# Patient Record
Sex: MALE | Employment: UNEMPLOYED | URBAN - METROPOLITAN AREA
[De-identification: names, ages, dates, MRNs, and addresses within clinical notes are randomized per-mention and may not be internally consistent; named-entity substitution may affect disease eponyms.]

---

## 2017-12-21 ENCOUNTER — THERAPY VISIT (OUTPATIENT)
Dept: PHYSICAL THERAPY | Facility: CLINIC | Age: 37
End: 2017-12-21
Payer: COMMERCIAL

## 2017-12-21 DIAGNOSIS — M25.561 ACUTE PAIN OF RIGHT KNEE: Primary | ICD-10-CM

## 2017-12-21 PROCEDURE — 97110 THERAPEUTIC EXERCISES: CPT | Mod: GP | Performed by: PHYSICAL THERAPIST

## 2017-12-21 PROCEDURE — 97112 NEUROMUSCULAR REEDUCATION: CPT | Mod: GP | Performed by: PHYSICAL THERAPIST

## 2017-12-21 PROCEDURE — 97161 PT EVAL LOW COMPLEX 20 MIN: CPT | Mod: GP | Performed by: PHYSICAL THERAPIST

## 2017-12-21 ASSESSMENT — ACTIVITIES OF DAILY LIVING (ADL)
WALK: ACTIVITY IS NOT DIFFICULT
AS_A_RESULT_OF_YOUR_KNEE_INJURY,_HOW_WOULD_YOU_RATE_YOUR_CURRENT_LEVEL_OF_DAILY_ACTIVITY?: NEARLY NORMAL
SIT WITH YOUR KNEE BENT: ACTIVITY IS NOT DIFFICULT
SWELLING: I DO NOT HAVE THE SYMPTOM
PAIN: THE SYMPTOM AFFECTS MY ACTIVITY MODERATELY
WEAKNESS: I DO NOT HAVE THE SYMPTOM
LIMPING: I DO NOT HAVE THE SYMPTOM
HOW_WOULD_YOU_RATE_THE_CURRENT_FUNCTION_OF_YOUR_KNEE_DURING_YOUR_USUAL_DAILY_ACTIVITIES_ON_A_SCALE_FROM_0_TO_100_WITH_100_BEING_YOUR_LEVEL_OF_KNEE_FUNCTION_PRIOR_TO_YOUR_INJURY_AND_0_BEING_THE_INABILITY_TO_PERFORM_ANY_OF_YOUR_USUAL_DAILY_ACTIVITIES?: 80
KNEE_ACTIVITY_OF_DAILY_LIVING_SCORE: 88.57
RISE FROM A CHAIR: ACTIVITY IS NOT DIFFICULT
GO UP STAIRS: ACTIVITY IS MINIMALLY DIFFICULT
GO DOWN STAIRS: ACTIVITY IS MINIMALLY DIFFICULT
SQUAT: ACTIVITY IS NOT DIFFICULT
HOW_WOULD_YOU_RATE_THE_OVERALL_FUNCTION_OF_YOUR_KNEE_DURING_YOUR_USUAL_DAILY_ACTIVITIES?: NEARLY NORMAL
KNEEL ON THE FRONT OF YOUR KNEE: ACTIVITY IS MINIMALLY DIFFICULT
RAW_SCORE: 62
KNEE_ACTIVITY_OF_DAILY_LIVING_SUM: 62
GIVING WAY, BUCKLING OR SHIFTING OF KNEE: THE SYMPTOM AFFECTS MY ACTIVITY SLIGHTLY
STAND: ACTIVITY IS NOT DIFFICULT
STIFFNESS: I DO NOT HAVE THE SYMPTOM

## 2017-12-21 NOTE — LETTER
[facility]  [address] [address] [place] [id number]  [phone]    2017    Re: Junito Wilks   :   1980  MRN:  [id number]   REFERRING PHYSICIAN:   Andres Boudreaux    [facility]  Date of Initial Evaluation:  2017  Visits:  Rxs Used: 1  Reason for Referral:  Acute pain of right knee    EVALUATION SUMMARY    [facility] Evaluation  Subjective:    Patient is a 37 year old male presenting with rehab right knee hpi. The history is provided by the patient. No  was used.   Junito Wilks is a 37 year old male with a right knee condition.  Condition occurred with:  Repetition/overuse.  Condition occurred: during recreation/sport.  This is a new condition  11/15/17 pt started noticing R knee pain from stepping up his workouts with weight lifting and the stairmill.  He is training to climb [employer].  He has been resting without success.  Patient reports pain:  Anterior.    Pain is described as aching and sharp and is constant Pain Scale: 1-6/10.  Associated symptoms:  Buckling/giving out. Pain is worse during the day.  Symptoms are exacerbated by descending stairs, ascending stairs, bending/squatting, running, walking and kneeling (weight lifting, working out, basketball) and relieved by nothing.  Since onset symptoms are unchanged.  General health as reported by patient is excellent. Pertinent medical history includes:  Sleep disorder/apnea.  Medical allergies: no.  Other surgeries include:  None reported.  Current medications:  Other.  Current occupation is Student.  Knee Activity of Daily Living Score: 88.57.    Barriers include:  Bathroom/bedroom on second floor.  Red flags:  None as reported by the patient.    Objective:    Standing Alignment:    Ankle/Foot:  Pes planus R  Gait:    Gait Type:  Normal     Flexibility/Screens:   Negative screens: Hip   Lower Extremity:  Decreased right lower extremity  flexibility:  Gastroc  Knee Evaluation:  ROM:  Arom wnl knee: good quad set and no extensor lag with SLR with 5/10 pain on R.  Strength wnl knee: glut max, hip abd, and glut med 5/5 B.      PROM    Hyperextension: Left: 5    Right:  3  Flexion: Left: 146    Right:  143  Ligament Testing:  Normal  Special Tests:   Left knee positive for the following special tests:  Joe's  Right knee positive for the following tests:  Joe's  Right knee negative for the following special tests:  Meniscal (Mild anterior knee pain wtih Thessaly's; neg Dago's)  Palpation:  Normal  Edema:  Normal  Mobility Testing:  Normal  Functional Testing:  : Mazariegos tape: lateral to medial glide decreased pain with SLR to 1/10.  Quad:    Single Leg Squat:  Left:        Normal control  Right:      Mod pes planus, femoral wobble, and R lateral trunk lean  Moderate loss of control and hip substitution  Bilateral Leg Squat:   Normal control      Assessment/Plan:      Patient is a 37 year old male with right side knee complaints.    Patient has the following significant findings with corresponding treatment plan.                Diagnosis 1:  PFP  Pain -  hot/cold therapy, splint/taping/bracing/orthotics and home program  Decreased ROM/flexibility - manual therapy and therapeutic exercise  Decreased strength - therapeutic exercise and therapeutic activities  Impaired balance - neuro re-education and therapeutic activities  Decreased proprioception - neuro re-education and therapeutic activities  Inflammation - cold therapy and self management/home program  Impaired muscle performance - neuro re-education  Decreased function - therapeutic activities    Therapy Evaluation Codes:   1) History comprised of:   Personal factors that impact the plan of care:      None.    Comorbidity factors that impact the plan of care are:      Sleep disorder/apnea.     Medications impacting care: None.  2) Examination of Body Systems comprised of:   Body structures and  functions that impact the plan of care:      Knee.   Activity limitations that impact the plan of care are:      Jumping, Lifting, Running, Sports, Squatting/kneeling, Stairs and Walking.  3) Clinical presentation characteristics are:   Stable/Uncomplicated.  4) Decision-Making    Low complexity using standardized patient assessment instrument and/or measureable assessment of functional outcome.  Cumulative Therapy Evaluation is: Low complexity.    Previous and current functional limitations:  (See Goal Flow Sheet for this information)    Short term and Long term goals: (See Goal Flow Sheet for this information)     Communication ability:  Patient appears to be able to clearly communicate and understand verbal and written communication and follow directions correctly.  Treatment Explanation - The following has been discussed with the patient:   RX ordered/plan of care  Anticipated outcomes  Possible risks and side effects  This patient would benefit from PT intervention to resume normal activities.   Rehab potential is good.    Frequency:  1 X week, once daily  Duration:  for 6 weeks  Discharge Plan:  Achieve all LTG.  Independent in home treatment program.  Reach maximal therapeutic benefit.    Thank you for your referral.    INQUIRIES  Therapist: Stacey Castro, PT, ATC   [address] [address]  Phone: [phone]  Fax: [phone]

## 2017-12-21 NOTE — PROGRESS NOTES
[facility] Evaluation  Subjective:    Patient is a 37 year old male presenting with rehab right knee hpi. The history is provided by the patient. No  was used.   Junito Wilks is a 37 year old male with a right knee condition.  Condition occurred with:  Repetition/overuse.  Condition occurred: during recreation/sport.  This is a new condition  11/15/17 pt started noticing R knee pain from stepping up his workouts with weight lifting and the stairmill.  He is training to climb [employer].  He has been resting without success..    Patient reports pain:  Anterior.    Pain is described as aching and sharp and is constant Pain Scale: 1-6/10.  Associated symptoms:  Buckling/giving out. Pain is worse during the day.  Symptoms are exacerbated by descending stairs, ascending stairs, bending/squatting, running, walking and kneeling (weight lifting, working out, basketball) and relieved by nothing.  Since onset symptoms are unchanged.        General health as reported by patient is excellent.                  Barriers include:  Bathroom/bedroom on second floor.    Red flags:  None as reported by the patient.                        Objective:    Standing Alignment:                Ankle/Foot:  Pes planus R    Gait:    Gait Type:  Normal         Flexibility/Screens:   Negative screens: Hip     Lower Extremity:      Decreased right lower extremity flexibility:  Gastroc                                                      Knee Evaluation:  ROM:  Arom wnl knee: good quad set and no extensor lag with SLR with 5/10 pain on R.  Strength wnl knee: glut max, hip abd, and glut med 5/5 B.    PROM    Hyperextension: Left: 5    Right:  3    Flexion: Left: 146    Right:  143        Ligament Testing:  Normal                Special Tests:   Left knee positive for the following special tests:  Joe's  Right knee positive for the following tests:  Joe's  Right knee negative for the following special  tests:  Meniscal (Mild anterior knee pain wtih Thessaly's; neg Dago's)  Palpation:  Normal      Edema:  Normal    Mobility Testing:  Normal            Functional Testing:  : Mazariegos tape: lateral to medial glide decreased pain with SLR to 1/10.        Quad:    Single Leg Squat:  Left:        Normal control  Right:      Mod pes planus, femoral wobble, and R lateral trunk lean  Moderate loss of control and hip substitution  Bilateral Leg Squat:   Normal control              General     ROS    Assessment/Plan:      Patient is a 37 year old male with right side knee complaints.    Patient has the following significant findings with corresponding treatment plan.                Diagnosis 1:  PFP  Pain -  hot/cold therapy, splint/taping/bracing/orthotics and home program  Decreased ROM/flexibility - manual therapy and therapeutic exercise  Decreased strength - therapeutic exercise and therapeutic activities  Impaired balance - neuro re-education and therapeutic activities  Decreased proprioception - neuro re-education and therapeutic activities  Inflammation - cold therapy and self management/home program  Impaired muscle performance - neuro re-education  Decreased function - therapeutic activities    Therapy Evaluation Codes:   1) History comprised of:   Personal factors that impact the plan of care:      None.    Comorbidity factors that impact the plan of care are:      Sleep disorder/apnea.     Medications impacting care: None.  2) Examination of Body Systems comprised of:   Body structures and functions that impact the plan of care:      Knee.   Activity limitations that impact the plan of care are:      Jumping, Lifting, Running, Sports, Squatting/kneeling, Stairs and Walking.  3) Clinical presentation characteristics are:   Stable/Uncomplicated.  4) Decision-Making    Low complexity using standardized patient assessment instrument and/or measureable assessment of functional outcome.  Cumulative Therapy  Evaluation is: Low complexity.    Previous and current functional limitations:  (See Goal Flow Sheet for this information)    Short term and Long term goals: (See Goal Flow Sheet for this information)     Communication ability:  Patient appears to be able to clearly communicate and understand verbal and written communication and follow directions correctly.  Treatment Explanation - The following has been discussed with the patient:   RX ordered/plan of care  Anticipated outcomes  Possible risks and side effects  This patient would benefit from PT intervention to resume normal activities.   Rehab potential is good.    Frequency:  1 X week, once daily  Duration:  for 6 weeks  Discharge Plan:  Achieve all LTG.  Independent in home treatment program.  Reach maximal therapeutic benefit.    Please refer to the daily flowsheet for treatment today, total treatment time and time spent performing 1:1 timed codes.

## 2017-12-22 NOTE — PROGRESS NOTES
[facility] Evaluation  Subjective:    Patient is a 37 year old male presenting with rehab left ankle/foot hpi.                                      Pertinent medical history includes:  Sleep disorder/apnea.  Medical allergies: no.  Other surgeries include:  None reported.  Current medications:  Other.  Current occupation is Student.                       Knee Activity of Daily Living Score: 88.57            Objective:    System    Physical Exam    General     ROS    Assessment/Plan:

## 2017-12-28 ENCOUNTER — THERAPY VISIT (OUTPATIENT)
Dept: PHYSICAL THERAPY | Facility: CLINIC | Age: 37
End: 2017-12-28
Payer: COMMERCIAL

## 2017-12-28 DIAGNOSIS — M25.561 ACUTE PAIN OF RIGHT KNEE: ICD-10-CM

## 2017-12-28 PROCEDURE — 97110 THERAPEUTIC EXERCISES: CPT | Mod: GP | Performed by: PHYSICAL THERAPIST

## 2017-12-28 PROCEDURE — 97112 NEUROMUSCULAR REEDUCATION: CPT | Mod: GP | Performed by: PHYSICAL THERAPIST

## 2017-12-28 PROCEDURE — 97140 MANUAL THERAPY 1/> REGIONS: CPT | Mod: GP | Performed by: PHYSICAL THERAPIST

## 2018-01-04 ENCOUNTER — THERAPY VISIT (OUTPATIENT)
Dept: PHYSICAL THERAPY | Facility: CLINIC | Age: 38
End: 2018-01-04
Payer: COMMERCIAL

## 2018-01-04 DIAGNOSIS — M25.561 ACUTE PAIN OF RIGHT KNEE: ICD-10-CM

## 2018-01-04 PROCEDURE — 97140 MANUAL THERAPY 1/> REGIONS: CPT | Mod: GP | Performed by: PHYSICAL THERAPIST

## 2018-01-04 PROCEDURE — 97110 THERAPEUTIC EXERCISES: CPT | Mod: GP | Performed by: PHYSICAL THERAPIST

## 2018-01-04 PROCEDURE — 97112 NEUROMUSCULAR REEDUCATION: CPT | Mod: GP | Performed by: PHYSICAL THERAPIST

## 2018-01-11 ENCOUNTER — THERAPY VISIT (OUTPATIENT)
Dept: PHYSICAL THERAPY | Facility: CLINIC | Age: 38
End: 2018-01-11
Payer: COMMERCIAL

## 2018-01-11 DIAGNOSIS — M25.561 ACUTE PAIN OF RIGHT KNEE: ICD-10-CM

## 2018-01-11 PROCEDURE — 97110 THERAPEUTIC EXERCISES: CPT | Mod: GP | Performed by: PHYSICAL THERAPIST

## 2018-01-11 PROCEDURE — 97112 NEUROMUSCULAR REEDUCATION: CPT | Mod: GP | Performed by: PHYSICAL THERAPIST

## 2018-01-11 PROCEDURE — 97140 MANUAL THERAPY 1/> REGIONS: CPT | Mod: GP | Performed by: PHYSICAL THERAPIST

## 2018-01-24 ENCOUNTER — THERAPY VISIT (OUTPATIENT)
Dept: PHYSICAL THERAPY | Facility: CLINIC | Age: 38
End: 2018-01-24
Payer: COMMERCIAL

## 2018-01-24 DIAGNOSIS — M25.561 ACUTE PAIN OF RIGHT KNEE: ICD-10-CM

## 2018-01-24 PROCEDURE — 97140 MANUAL THERAPY 1/> REGIONS: CPT | Mod: GP | Performed by: PHYSICAL THERAPIST

## 2018-01-24 PROCEDURE — 97110 THERAPEUTIC EXERCISES: CPT | Mod: GP | Performed by: PHYSICAL THERAPIST

## 2018-01-24 PROCEDURE — 97112 NEUROMUSCULAR REEDUCATION: CPT | Mod: GP | Performed by: PHYSICAL THERAPIST

## 2018-02-08 ENCOUNTER — THERAPY VISIT (OUTPATIENT)
Dept: PHYSICAL THERAPY | Facility: CLINIC | Age: 38
End: 2018-02-08
Payer: COMMERCIAL

## 2018-02-08 DIAGNOSIS — M25.561 ACUTE PAIN OF RIGHT KNEE: ICD-10-CM

## 2018-02-08 PROCEDURE — 97530 THERAPEUTIC ACTIVITIES: CPT | Mod: GP | Performed by: PHYSICAL THERAPIST

## 2018-02-08 PROCEDURE — 97112 NEUROMUSCULAR REEDUCATION: CPT | Mod: GP | Performed by: PHYSICAL THERAPIST

## 2018-02-08 PROCEDURE — 97110 THERAPEUTIC EXERCISES: CPT | Mod: GP | Performed by: PHYSICAL THERAPIST

## 2018-02-08 ASSESSMENT — ACTIVITIES OF DAILY LIVING (ADL)
GO UP STAIRS: ACTIVITY IS NOT DIFFICULT
KNEE_ACTIVITY_OF_DAILY_LIVING_SUM: 66
KNEEL ON THE FRONT OF YOUR KNEE: ACTIVITY IS SOMEWHAT DIFFICULT
KNEE_ACTIVITY_OF_DAILY_LIVING_SCORE: 94.29
SWELLING: I DO NOT HAVE THE SYMPTOM
PAIN: I HAVE THE SYMPTOM BUT IT DOES NOT AFFECT MY ACTIVITY
SQUAT: ACTIVITY IS NOT DIFFICULT
LIMPING: I DO NOT HAVE THE SYMPTOM
WEAKNESS: I DO NOT HAVE THE SYMPTOM
STIFFNESS: I DO NOT HAVE THE SYMPTOM
RAW_SCORE: 66
GIVING WAY, BUCKLING OR SHIFTING OF KNEE: I HAVE THE SYMPTOM BUT IT DOES NOT AFFECT MY ACTIVITY
HOW_WOULD_YOU_RATE_THE_OVERALL_FUNCTION_OF_YOUR_KNEE_DURING_YOUR_USUAL_DAILY_ACTIVITIES?: NORMAL
RISE FROM A CHAIR: ACTIVITY IS NOT DIFFICULT
SIT WITH YOUR KNEE BENT: ACTIVITY IS NOT DIFFICULT
WALK: ACTIVITY IS NOT DIFFICULT
GO DOWN STAIRS: ACTIVITY IS NOT DIFFICULT
AS_A_RESULT_OF_YOUR_KNEE_INJURY,_HOW_WOULD_YOU_RATE_YOUR_CURRENT_LEVEL_OF_DAILY_ACTIVITY?: NORMAL
STAND: ACTIVITY IS NOT DIFFICULT
HOW_WOULD_YOU_RATE_THE_CURRENT_FUNCTION_OF_YOUR_KNEE_DURING_YOUR_USUAL_DAILY_ACTIVITIES_ON_A_SCALE_FROM_0_TO_100_WITH_100_BEING_YOUR_LEVEL_OF_KNEE_FUNCTION_PRIOR_TO_YOUR_INJURY_AND_0_BEING_THE_INABILITY_TO_PERFORM_ANY_OF_YOUR_USUAL_DAILY_ACTIVITIES?: 88

## 2018-02-08 NOTE — PROGRESS NOTES
Subjective:  HPI                    Objective:  System    Physical Exam    General     ROS    Assessment/Plan:    PROGRESS  REPORT    Progress reporting period is from 12/21/17 to 2/8/18.       SUBJECTIVE  Subjective changes noted by patient:  Jose is reporting 60-70% improvement in his right knee.  C/C is that most days he still feels a dull ache to the knee.  Strength is progressing well.       Current pain level is 0-1/10  .    Initial Pain level:  (1-6/10).   Changes in function:  Yes (See Goal flowsheet attached for changes in current functional level)  Adverse reaction to treatment or activity: None    OBJECTIVE  -Right knee PROM full and pain free  -Good quad set and no extensor lag with SLR  -Mazariegos tape: lateral to medial glide is beneficial and he is using it for working out (no longer needs it all day, every day)  -OTC orthotics added and have been really helpful  -No TTP  -Mild discomfort with Thessaly's meniscus test with IR; neg McMurrary's  -Good form with 1 and 2 leg squats without UE support  -Tolerating high level core, LE CKC, and proprioceptive drills.  Able to advance to jumping today and will progress to jogging at next visit if all goes well.        ASSESSMENT/PLAN  Updated problem list and treatment plan: Diagnosis 1:  Right PFP  Pain -  hot/cold therapy, manual therapy, splint/taping/bracing/orthotics and home program  Decreased ROM/flexibility - manual therapy and therapeutic exercise  Decreased strength - therapeutic exercise and therapeutic activities  Impaired balance - neuro re-education and therapeutic activities  Decreased proprioception - neuro re-education and therapeutic activities  Inflammation - cold therapy and self management/home program  Impaired muscle performance - neuro re-education  Decreased function - therapeutic activities  STG/LTGs have been met or progress has been made towards goals:  Yes (See Goal flow sheet completed today.)  Assessment of Progress: The  patient's condition is improving.  Self Management Plans:  Patient has been instruct in a home exercise program and in self management of symptoms.  I have re-evaluated this patient and find that the nature, scope, duration and intensity of the therapy is appropriate for the medical condition of the patient.  Junito continues to require the following intervention to meet STG and LTG's:  PT    Recommendations:  This patient would benefit from continued therapy.     Frequency:  2 X a month, once daily  Duration:  for 1 months        Please refer to the daily flowsheet for treatment today, total treatment time and time spent performing 1:1 timed codes.

## 2018-02-08 NOTE — LETTER
[facility]  [address] [address] [place] [id number]  [phone]    2018    Re: Junito Wilks   :   1980  MRN:  [id number]   REFERRING PHYSICIAN:   Andres Boudreaux    [facility]  Date of Initial Evaluation:  2017  Visits:  Rxs Used: 6  Reason for Referral:  Acute pain of right knee    PROGRESS  REPORT  Progress reporting period is from 17 to 18.       SUBJECTIVE  Subjective changes noted by patient:  Jose is reporting 60-70% improvement in his right knee.  C/C is that most days he still feels a dull ache to the knee.  Strength is progressing well.    Current pain level is 0-1/10.    Initial Pain level:  (1-6/10).   Changes in function:  Yes (See Goal flowsheet attached for changes in current functional level).  Adverse reaction to treatment or activity: None    OBJECTIVE  -Right knee PROM full and pain free  -Good quad set and no extensor lag with SLR  -Mazariegos tape: lateral to medial glide is beneficial and he is using it for working out (no longer needs it all day, every day)  -OTC orthotics added and have been really helpful  -No TTP  -Mild discomfort with Thessaly's meniscus test with IR; neg McMurrary's  -Good form with 1 and 2 leg squats without UE support  -Tolerating high level core, LE CKC, and proprioceptive drills.  Able to advance to jumping today and will progress to jogging at next visit if all goes well.      ASSESSMENT/PLAN  Updated problem list and treatment plan: Diagnosis 1:  Right PFP  Pain -  hot/cold therapy, manual therapy, splint/taping/bracing/orthotics and home program  Decreased ROM/flexibility - manual therapy and therapeutic exercise  Decreased strength - therapeutic exercise and therapeutic activities  Impaired balance - neuro re-education and therapeutic activities  Decreased proprioception - neuro re-education and therapeutic activities  Inflammation - cold therapy and self management/home  program  Impaired muscle performance - neuro re-education  Decreased function - therapeutic activities  STG/LTGs have been met or progress has been made towards goals:  Yes (See Goal flow sheet completed today.)  Assessment of Progress: The patient's condition is improving.  Self Management Plans:  Patient has been instruct in a home exercise program and in self management of symptoms.  I have re-evaluated this patient and find that the nature, scope, duration and intensity of the therapy is appropriate for the medical condition of the patient.  Junito continues to require the following intervention to meet STG and LTG's:  PT    Recommendations:  This patient would benefit from continued therapy.     Frequency:  2 X a month, once daily  Duration:  for 1 months    Thank you for your referral.    INQUIRIES  Therapist: Stacey Castro, PT, ATC   [address] [address]  Phone: [phone]  Fax: [phone]

## 2018-02-21 ENCOUNTER — THERAPY VISIT (OUTPATIENT)
Dept: PHYSICAL THERAPY | Facility: CLINIC | Age: 38
End: 2018-02-21
Payer: COMMERCIAL

## 2018-02-21 DIAGNOSIS — M25.561 ACUTE PAIN OF RIGHT KNEE: ICD-10-CM

## 2018-02-21 PROCEDURE — 97530 THERAPEUTIC ACTIVITIES: CPT | Mod: GP | Performed by: PHYSICAL THERAPIST

## 2018-02-21 PROCEDURE — 97110 THERAPEUTIC EXERCISES: CPT | Mod: GP | Performed by: PHYSICAL THERAPIST

## 2018-02-21 PROCEDURE — 97112 NEUROMUSCULAR REEDUCATION: CPT | Mod: GP | Performed by: PHYSICAL THERAPIST

## 2018-02-21 NOTE — LETTER
[facility]  [address] [address] [place] [id number]  [phone]    2018    Re: Junito Wilks   :   1980  MRN:  [id number]   REFERRING PHYSICIAN:   Andres Boudreaux    [facility]    Date of Initial Evaluation:  2017  Visits:  Rxs Used: 7 (add order x2)  Reason for Referral:  Acute pain of right knee    DISCHARGE REPORT  Progress reporting period is from 18 to 18.     SUBJECTIVE  Subjective: Sig improvement overall.  Tolerating hard workout and jumping and no issues.  Feeling good sitting in class.  Cont to tape for workouts.  Pt called and cancelled his remaining appointment as he is feeling good.  Current Pain level: 0/10.  Initial Pain level:  (1-6/10).  Changes in function: Yes, see goal flow sheet for change in function.  Adverse reactions: None.  The objective findings are from DOS 18.    OBJECTIVE  Objective: No TTP or tone issues to distal/lat quad.  SL hop: L 110; R 90 (82%)  Good form with jumping and jogging.  INstruct in progression.      ASSESSMENT/PLAN  Updated problem list and treatment plan: Diagnosis 1:  PFP  Pain -  hot/cold therapy, manual therapy, splint/taping/bracing/orthotics and home program  Decreased ROM/flexibility - manual therapy and therapeutic exercise  Decreased strength - therapeutic exercise and therapeutic activities  Decreased proprioception - neuro re-education and therapeutic activities  Inflammation - cold therapy and self management/home program  Impaired muscle performance - neuro re-education  Decreased function - therapeutic activities  STG/LTGs have been met or progress has been made towards goals:  Yes (See Goal flow sheet completed today.)  Assessment of Progress: The patient's condition is improving.  Self Management Plans:  Patient is independent in a home treatment program.  Patient is independent in self management of symptoms.  I have re-evaluated this patient and find that the  nature, scope, duration and intensity of the therapy is appropriate for the medical condition of the patient.  Junito continues to require the following intervention to meet STG and LTG's: PT intervention is no longer required to meet STG/LTG.  We will discharge this patient from PT.    Recommendations:  This patient is ready to be discharged from therapy and continue their home treatment program.    Thank you for your referral.    INQUIRIES  Therapist: Stacey Castro, PT, ATC  [address] [address]  Phone: [phone]  Fax: [phone]

## 2018-03-22 PROBLEM — M25.561 ACUTE PAIN OF RIGHT KNEE: Status: RESOLVED | Noted: 2017-12-21 | Resolved: 2018-03-22

## 2018-03-22 NOTE — PROGRESS NOTES
Subjective:  HPI                    Objective:  System    Physical Exam    General     ROS    Assessment/Plan:    DISCHARGE REPORT    Progress reporting period is from 2/8/18 to 2/21/18.     SUBJECTIVE  Subjective: Sig improvement overall.  Tolerating hard workout and jumping and no issues.  Feeling good sitting in class.  Cont to tape for workouts.  Pt called and cancelled his remaining appointment as he is feeling good.  Current Pain level: 0/10   Initial Pain level:  (1-6/10)   Changes in function: Yes, see goal flow sheet for change in function   Adverse reactions: None;   ,     The objective findings are from DOS 2/21/18.    OBJECTIVE  Objective: No TTP or tone issues to distal/lat quad.  SL hop: L 110; R 90 (82%)  Good form with jumping and jogging.  INstruct in progression.      ASSESSMENT/PLAN  Updated problem list and treatment plan: Diagnosis 1:  PFP  Pain -  hot/cold therapy, manual therapy, splint/taping/bracing/orthotics and home program  Decreased ROM/flexibility - manual therapy and therapeutic exercise  Decreased strength - therapeutic exercise and therapeutic activities  Decreased proprioception - neuro re-education and therapeutic activities  Inflammation - cold therapy and self management/home program  Impaired muscle performance - neuro re-education  Decreased function - therapeutic activities  STG/LTGs have been met or progress has been made towards goals:  Yes (See Goal flow sheet completed today.)  Assessment of Progress: The patient's condition is improving.  Self Management Plans:  Patient is independent in a home treatment program.  Patient is independent in self management of symptoms.  I have re-evaluated this patient and find that the nature, scope, duration and intensity of the therapy is appropriate for the medical condition of the patient.  Junito continues to require the following intervention to meet STG and LTG's: PT intervention is no longer required to meet STG/LTG.  We will  discharge this patient from PT.    Recommendations:  This patient is ready to be discharged from therapy and continue their home treatment program.

## 2020-12-21 ENCOUNTER — THERAPY VISIT (OUTPATIENT)
Dept: PHYSICAL THERAPY | Facility: CLINIC | Age: 40
End: 2020-12-21
Payer: COMMERCIAL

## 2020-12-21 DIAGNOSIS — M25.571 PAIN IN JOINT, ANKLE AND FOOT, RIGHT: ICD-10-CM

## 2020-12-21 PROCEDURE — 97162 PT EVAL MOD COMPLEX 30 MIN: CPT | Mod: GP | Performed by: PHYSICAL THERAPIST

## 2020-12-21 PROCEDURE — 97110 THERAPEUTIC EXERCISES: CPT | Mod: GP | Performed by: PHYSICAL THERAPIST

## 2020-12-21 PROCEDURE — 97140 MANUAL THERAPY 1/> REGIONS: CPT | Mod: GP | Performed by: PHYSICAL THERAPIST

## 2020-12-21 NOTE — LETTER
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 He has had a 2 year history of right ankle pain. He is a runner and mountaineer. It was found that he had avulsed his tendon. He underwent ankle stabilization surgery on 2020. Wearing a CAM and using two crutches at this time. 3/10 pain. Very motivated to start a HEP and to gradually wean from the CAM.  The history is provided by the patient. No  was used.     Therapist Generated HPI Evaluation  Type of problem:  Right ankle.  This is a chronic condition.  Condition occurred with:  Repetition/overuse and running (mountaineering).  Patient reports pain:  Lower leg (incisional).  Pain is described as aching and is constant.  Pain is worse during the day.  Since onset symptoms are gradually improving.  Associated symptoms:  Loss of motion/stiffness, loss of strength and edema. Exacerbated by: post surgical.  and relieved by rest and bracing/immobilizing.  Work activity restrictions: currently not working.  Barriers include:  None as reported by patient.    Patient Health History  Pain is reported as 3/10 on pain scale.  General health as reported by patient is excellent.  Pertinent medical history includes: depression, history of fractures, sleep disorder/apnea and numbness/tingling.   Red flags:  Pain at rest/night.  Medical allergies: none.   Surgeries include:  Orthopedic surgery.    Current medications:  Sleep medication and anti-depressants.   "  Current occupation is physical therapist.          Re: Junito Wilks   :   1980    Objective:    Gait:    Assistive Devices:  Crutches and CAM    Ankle/Foot Evaluation  ROM:    AROM:    Dorsiflexion: Left:    Right:   0  Plantarflexion: Left:     Right:  20    Strength is not assessed.    PALPATION:   Right ankle tenderness present at:   gastroc/soleus; incisional and anterior tibiofibular ligament    EDEMA:   Right ankle edema present at:  \"      Figure 8 right: \"                                       Musculoskeletal:        Feet:            Re: Junito Wilks   :   1980    Assessment/Plan:    Patient is a 40 year old male with right side ankle complaints.    Patient has the following significant findings with corresponding treatment plan.                Diagnosis 1:  S/P right ankle stabilization SX  Pain -  hot/cold therapy, manual therapy, self management, education and home program  Decreased ROM/flexibility - manual therapy, therapeutic exercise and home program  Decreased strength - therapeutic exercise, therapeutic activities and home program  Impaired balance - neuro re-education, therapeutic activities and home program  Edema - self management/home program  Impaired gait - gait training and home program  Impaired muscle performance - neuro re-education and home program  Decreased function - therapeutic activities and home program    Therapy Evaluation Codes:   1) History comprised of:   Personal factors that impact the plan of care:      None.    Comorbidity factors that impact the plan of care are:      Depression, Pain at night/rest and Sleep disorder/apnea.     Medications impacting care: Anti-depressant and Sleep.  2) Examination of Body Systems comprised of:   Body structures and functions that impact the plan of care:      Ankle.   Activity limitations that impact the plan of care are:      Running and Walking.  3) Clinical presentation characteristics " are:   Evolving/Changing.  4) Decision-Making    Moderate complexity using standardized patient assessment instrument and/or measureable assessment of functional outcome.  Cumulative Therapy Evaluation is: Moderate complexity.    Previous and current functional limitations:  (See Goal Flow Sheet for this information)    Short term and Long term goals: (See Goal Flow Sheet for this information)     Communication ability:  Patient appears to be able to clearly communicate and understand verbal and written communication and follow directions correctly.  Treatment Explanation - The following has been discussed with the patient:   RX ordered/plan of care  Anticipated outcomes  Possible risks and side effects  This patient would benefit from PT intervention to resume normal activities.   Rehab potential is good.    Frequency:  1 X week, once daily  Duration:  for 12 weeks  Discharge Plan:  Achieve all LTG.  Independent in home treatment program.  Reach maximal therapeutic benefit.      Re: Junito Wilks   :   1980      Thank you for your referral.    INQUIRIES  Therapist: Beth Dale PT, DPT  [address] [address]  Phone: [phone]  Fax: [phone]

## 2020-12-22 NOTE — PROGRESS NOTES
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 He has had a 2 year history of right ankle pain. He is a runner and mountaineer. It was found that he had avulsed his tendon. He underwent ankle stabilization surgery on 11/19/2020. Wearing a CAM and using two crutches at this time. 3/10 pain. Very motivated to start a HEP and to gradually wean from the CAM.    The history is provided by the patient. No  was used.   Therapist Generated HPI Evaluation         Type of problem:  Right ankle.    This is a chronic condition.  Condition occurred with:  Repetition/overuse and running (mountaineering).    Patient reports pain:  Lower leg (incisional).  Pain is described as aching and is constant.  Pain is worse during the day.  Since onset symptoms are gradually improving.  Associated symptoms:  Loss of motion/stiffness, loss of strength and edema. Exacerbated by: post surgical.  and relieved by rest and bracing/immobilizing.      Work activity restrictions: currently not working.  Barriers include:  None as reported by patient.    Patient Health History         Pain is reported as 3/10 on pain scale.  General health as reported by patient is excellent.  Pertinent medical history includes: depression, history of fractures, sleep disorder/apnea and numbness/tingling.   Red flags:  Pain at rest/night.  Medical allergies: none.   Surgeries include:  Orthopedic surgery.    Current medications:  Sleep medication and anti-depressants.    Current occupation is physical therapist.                                       Objective:    Gait:    Assistive Devices:  Crutches and CAM            Ankle/Foot Evaluation  ROM:    AROM:    Dorsiflexion: Left:    Right:   0  Plantarflexion: Left:     Right:  20            Strength is not assessed.      PALPATION:     Right ankle tenderness present at:   gastroc/soleus;  "incisional and anterior tibiofibular ligament  EDEMA:     Right ankle edema present at:  1/2\"      Figure 8 right: 1/2\"                                                                                           Musculoskeletal:        Feet:        ROS    Assessment/Plan:    Patient is a 40 year old male with right side ankle complaints.    Patient has the following significant findings with corresponding treatment plan.                Diagnosis 1:  S/P right ankle stabilization SX  Pain -  hot/cold therapy, manual therapy, self management, education and home program  Decreased ROM/flexibility - manual therapy, therapeutic exercise and home program  Decreased strength - therapeutic exercise, therapeutic activities and home program  Impaired balance - neuro re-education, therapeutic activities and home program  Edema - self management/home program  Impaired gait - gait training and home program  Impaired muscle performance - neuro re-education and home program  Decreased function - therapeutic activities and home program    Therapy Evaluation Codes:   1) History comprised of:   Personal factors that impact the plan of care:      None.    Comorbidity factors that impact the plan of care are:      Depression, Pain at night/rest and Sleep disorder/apnea.     Medications impacting care: Anti-depressant and Sleep.  2) Examination of Body Systems comprised of:   Body structures and functions that impact the plan of care:      Ankle.   Activity limitations that impact the plan of care are:      Running and Walking.  3) Clinical presentation characteristics are:   Evolving/Changing.  4) Decision-Making    Moderate complexity using standardized patient assessment instrument and/or measureable assessment of functional outcome.  Cumulative Therapy Evaluation is: Moderate complexity.    Previous and current functional limitations:  (See Goal Flow Sheet for this information)    Short term and Long term goals: (See Goal Flow Sheet " for this information)     Communication ability:  Patient appears to be able to clearly communicate and understand verbal and written communication and follow directions correctly.  Treatment Explanation - The following has been discussed with the patient:   RX ordered/plan of care  Anticipated outcomes  Possible risks and side effects  This patient would benefit from PT intervention to resume normal activities.   Rehab potential is good.    Frequency:  1 X week, once daily  Duration:  for 12 weeks  Discharge Plan:  Achieve all LTG.  Independent in home treatment program.  Reach maximal therapeutic benefit.    Please refer to the daily flowsheet for treatment today, total treatment time and time spent performing 1:1 timed codes.

## 2020-12-29 ENCOUNTER — THERAPY VISIT (OUTPATIENT)
Dept: PHYSICAL THERAPY | Facility: CLINIC | Age: 40
End: 2020-12-29
Payer: COMMERCIAL

## 2020-12-29 DIAGNOSIS — M25.571 PAIN IN JOINT, ANKLE AND FOOT, RIGHT: ICD-10-CM

## 2020-12-29 PROCEDURE — 97140 MANUAL THERAPY 1/> REGIONS: CPT | Mod: GP | Performed by: PHYSICAL THERAPIST

## 2020-12-29 PROCEDURE — 97110 THERAPEUTIC EXERCISES: CPT | Mod: GP | Performed by: PHYSICAL THERAPIST

## 2020-12-31 ENCOUNTER — THERAPY VISIT (OUTPATIENT)
Dept: PHYSICAL THERAPY | Facility: CLINIC | Age: 40
End: 2020-12-31
Payer: COMMERCIAL

## 2020-12-31 DIAGNOSIS — M25.571 PAIN IN JOINT, ANKLE AND FOOT, RIGHT: ICD-10-CM

## 2020-12-31 PROCEDURE — 97110 THERAPEUTIC EXERCISES: CPT | Mod: GP | Performed by: PHYSICAL THERAPIST

## 2020-12-31 PROCEDURE — 97116 GAIT TRAINING THERAPY: CPT | Mod: GP | Performed by: PHYSICAL THERAPIST

## 2020-12-31 PROCEDURE — 97140 MANUAL THERAPY 1/> REGIONS: CPT | Mod: GP | Performed by: PHYSICAL THERAPIST

## 2021-01-05 ENCOUNTER — THERAPY VISIT (OUTPATIENT)
Dept: PHYSICAL THERAPY | Facility: CLINIC | Age: 41
End: 2021-01-05
Payer: COMMERCIAL

## 2021-01-05 DIAGNOSIS — M25.571 PAIN IN JOINT, ANKLE AND FOOT, RIGHT: ICD-10-CM

## 2021-01-05 PROCEDURE — 97116 GAIT TRAINING THERAPY: CPT | Mod: GP | Performed by: PHYSICAL THERAPIST

## 2021-01-05 PROCEDURE — 97110 THERAPEUTIC EXERCISES: CPT | Mod: GP | Performed by: PHYSICAL THERAPIST

## 2021-01-05 PROCEDURE — 97140 MANUAL THERAPY 1/> REGIONS: CPT | Mod: GP | Performed by: PHYSICAL THERAPIST

## 2021-01-07 ENCOUNTER — THERAPY VISIT (OUTPATIENT)
Dept: PHYSICAL THERAPY | Facility: CLINIC | Age: 41
End: 2021-01-07
Payer: COMMERCIAL

## 2021-01-07 DIAGNOSIS — M25.571 PAIN IN JOINT, ANKLE AND FOOT, RIGHT: ICD-10-CM

## 2021-01-07 PROCEDURE — 97140 MANUAL THERAPY 1/> REGIONS: CPT | Mod: GP | Performed by: PHYSICAL THERAPIST

## 2021-01-07 PROCEDURE — 97110 THERAPEUTIC EXERCISES: CPT | Mod: GP | Performed by: PHYSICAL THERAPIST

## 2021-01-07 PROCEDURE — 97116 GAIT TRAINING THERAPY: CPT | Mod: GP | Performed by: PHYSICAL THERAPIST

## 2021-01-12 ENCOUNTER — THERAPY VISIT (OUTPATIENT)
Dept: PHYSICAL THERAPY | Facility: CLINIC | Age: 41
End: 2021-01-12
Payer: COMMERCIAL

## 2021-01-12 DIAGNOSIS — M25.571 PAIN IN JOINT, ANKLE AND FOOT, RIGHT: ICD-10-CM

## 2021-01-12 PROCEDURE — 97110 THERAPEUTIC EXERCISES: CPT | Mod: GP | Performed by: PHYSICAL THERAPIST

## 2021-01-12 PROCEDURE — 97140 MANUAL THERAPY 1/> REGIONS: CPT | Mod: GP | Performed by: PHYSICAL THERAPIST

## 2021-01-14 ENCOUNTER — THERAPY VISIT (OUTPATIENT)
Dept: PHYSICAL THERAPY | Facility: CLINIC | Age: 41
End: 2021-01-14
Payer: COMMERCIAL

## 2021-01-14 DIAGNOSIS — M25.571 PAIN IN JOINT, ANKLE AND FOOT, RIGHT: ICD-10-CM

## 2021-01-14 PROCEDURE — 97140 MANUAL THERAPY 1/> REGIONS: CPT | Mod: GP | Performed by: PHYSICAL THERAPIST

## 2021-01-14 PROCEDURE — 97110 THERAPEUTIC EXERCISES: CPT | Mod: GP | Performed by: PHYSICAL THERAPIST

## 2021-01-14 PROCEDURE — 97116 GAIT TRAINING THERAPY: CPT | Mod: GP | Performed by: PHYSICAL THERAPIST

## 2021-01-19 ENCOUNTER — THERAPY VISIT (OUTPATIENT)
Dept: PHYSICAL THERAPY | Facility: CLINIC | Age: 41
End: 2021-01-19
Payer: COMMERCIAL

## 2021-01-19 DIAGNOSIS — M25.571 PAIN IN JOINT, ANKLE AND FOOT, RIGHT: ICD-10-CM

## 2021-01-19 PROCEDURE — 97140 MANUAL THERAPY 1/> REGIONS: CPT | Mod: GP | Performed by: PHYSICAL THERAPIST

## 2021-01-19 PROCEDURE — 97116 GAIT TRAINING THERAPY: CPT | Mod: GP | Performed by: PHYSICAL THERAPIST

## 2021-01-19 PROCEDURE — 97110 THERAPEUTIC EXERCISES: CPT | Mod: GP | Performed by: PHYSICAL THERAPIST

## 2021-01-21 ENCOUNTER — THERAPY VISIT (OUTPATIENT)
Dept: PHYSICAL THERAPY | Facility: CLINIC | Age: 41
End: 2021-01-21
Payer: COMMERCIAL

## 2021-01-21 DIAGNOSIS — M25.571 PAIN IN JOINT, ANKLE AND FOOT, RIGHT: ICD-10-CM

## 2021-01-21 PROCEDURE — 97116 GAIT TRAINING THERAPY: CPT | Mod: GP | Performed by: PHYSICAL THERAPIST

## 2021-01-21 PROCEDURE — 97110 THERAPEUTIC EXERCISES: CPT | Mod: GP | Performed by: PHYSICAL THERAPIST

## 2021-01-21 PROCEDURE — 97140 MANUAL THERAPY 1/> REGIONS: CPT | Mod: GP | Performed by: PHYSICAL THERAPIST

## 2021-01-26 ENCOUNTER — THERAPY VISIT (OUTPATIENT)
Dept: PHYSICAL THERAPY | Facility: CLINIC | Age: 41
End: 2021-01-26
Payer: COMMERCIAL

## 2021-01-26 DIAGNOSIS — M25.571 PAIN IN JOINT, ANKLE AND FOOT, RIGHT: ICD-10-CM

## 2021-01-26 PROCEDURE — 97140 MANUAL THERAPY 1/> REGIONS: CPT | Mod: GP | Performed by: PHYSICAL THERAPIST

## 2021-01-26 PROCEDURE — 97110 THERAPEUTIC EXERCISES: CPT | Mod: GP | Performed by: PHYSICAL THERAPIST

## 2021-01-26 PROCEDURE — 97116 GAIT TRAINING THERAPY: CPT | Mod: GP | Performed by: PHYSICAL THERAPIST

## 2021-01-29 ENCOUNTER — THERAPY VISIT (OUTPATIENT)
Dept: PHYSICAL THERAPY | Facility: CLINIC | Age: 41
End: 2021-01-29
Payer: COMMERCIAL

## 2021-01-29 DIAGNOSIS — M25.571 PAIN IN JOINT, ANKLE AND FOOT, RIGHT: ICD-10-CM

## 2021-01-29 PROCEDURE — 97110 THERAPEUTIC EXERCISES: CPT | Mod: GP | Performed by: PHYSICAL THERAPIST

## 2021-01-29 PROCEDURE — 97116 GAIT TRAINING THERAPY: CPT | Mod: GP | Performed by: PHYSICAL THERAPIST

## 2021-01-29 PROCEDURE — 97140 MANUAL THERAPY 1/> REGIONS: CPT | Mod: GP | Performed by: PHYSICAL THERAPIST

## 2021-01-29 NOTE — LETTER
"[facility]  [address] [address] [place] [id number]  [phone]    2021    Re: Junito Wilks   :   1980  MRN:  [id number]   REFERRING PHYSICIAN:   Charlie Sanchez    [facility]    Date of Initial Evaluation:  2020  Visits:  Rxs Used: 11  Reason for Referral:  Pain in joint, ankle and foot, right    PROGRESS  REPORT  Progress reporting period is from 2020 to 2021.       SUBJECTIVE  Subjective changes noted by patient:  Overall Jose has done well. He is 10 weeks post op. Weaned from his CAM boot for indoor walking, but continues to use it for outdoor walks and when he wants to walk fast. He has been very motivated with his HEP. The concern is that he has lateral ankle pain, with some \"snapping\" when he tries to walk faster or works on squats. This has been recalcitrant despite best effort with strengthening. Decreases with rest.  Current pain level: Varies from 1 - 5/10   Previous pain level was  3/10 when he was wearing his boot.  Changes in function:  Using the boot for stairs, can walk x 20' indoors without the boot. SLS x 1'.  Adverse reaction to treatment or activity: Need to avoid DF and eversion yet.    OBJECTIVE:  Changes noted in objective findings:  1/2\" edema with figure 8. Strength is good throughout when not loading the joint. DF to 7 degrees. 40 degrees PF.      ASSESSMENT/PLAN  Updated problem list and treatment plan: Diagnosis 1:  S/P right ankle tightrope procedure. Pain -  hot/cold therapy, manual therapy, splint/taping/bracing/orthotics, self management, education and home program  Decreased ROM/flexibility - manual therapy, therapeutic exercise and home program  Decreased strength - therapeutic exercise, therapeutic activities and home program  Impaired balance - neuro re-education, therapeutic activities and home program  Edema - self management/home program  Impaired gait - gait training and home " program  Impaired muscle performance - neuro re-education and home program  Decreased function - therapeutic activities and home program  STG/LTGs have been met or progress has been made towards goals:  Yes (See Goal flow sheet completed today.)  Assessment of Progress: The patient's progress has plateaued.  Self Management Plans:  Patient has been instructed in a home treatment program.  Patient  has been instructed in self management of symptoms.      Recommendations:  Asking the patient to return to see his surgeon, to assess if we are on course with his recovery.    Thank you for your referral.    INQUIRIES  Therapist: Terri Dale, PT, DPT  [address] [address]  Phone: [phone]  Fax: [phone]

## 2021-01-29 NOTE — PROGRESS NOTES
"Subjective:  HPI  Physical Exam                    Objective:  System    Physical Exam    General     ROS    Assessment/Plan:    PROGRESS  REPORT    Progress reporting period is from 12/21/2020 to 1/29/2021.       SUBJECTIVE  Subjective changes noted by patient:  Overall Jose has done well. He is 10 weeks post op. Weaned from his CAM boot for indoor walking, but continues to use it for outdoor walks and when he wants to walk fast. He has been very motivated with his HEP. The concern is that he has lateral ankle pain, with some \"snapping\" when he tries to walk faster or works on squats. This has been recalcitrant despite best effort with strengthening. Decreases with rest..       Current pain level: Varies from 1 - 5/10   Previous pain level was  3/10 when he was wearing his boot.  .   Changes in function:  Using the boot for stairs, can walk x 20' indoors without the boot. SLS x 1'.  Adverse reaction to treatment or activity: Need to avoid DF and eversion yet.    OBJECTIVE:  Changes noted in objective findings:  1/2\" edema with figure 8. Strength is good throughout when not loading the joint. DF to 7 degrees. 40 degrees PF.      ASSESSMENT/PLAN  Updated problem list and treatment plan: Diagnosis 1:  S/P right ankle tightrope procedure. Pain -  hot/cold therapy, manual therapy, splint/taping/bracing/orthotics, self management, education and home program  Decreased ROM/flexibility - manual therapy, therapeutic exercise and home program  Decreased strength - therapeutic exercise, therapeutic activities and home program  Impaired balance - neuro re-education, therapeutic activities and home program  Edema - self management/home program  Impaired gait - gait training and home program  Impaired muscle performance - neuro re-education and home program  Decreased function - therapeutic activities and home program  STG/LTGs have been met or progress has been made towards goals:  Yes (See Goal flow sheet completed " today.)  Assessment of Progress: The patient's progress has plateaued.  Self Management Plans:  Patient has been instructed in a home treatment program.  Patient  has been instructed in self management of symptoms.      Recommendations:  Asking the patient to return to see his surgeon, to assess if we are on course with his recovery.    Please refer to the daily flowsheet for treatment today, total treatment time and time spent performing 1:1 timed codes.

## 2021-02-04 ENCOUNTER — THERAPY VISIT (OUTPATIENT)
Dept: PHYSICAL THERAPY | Facility: CLINIC | Age: 41
End: 2021-02-04
Payer: COMMERCIAL

## 2021-02-04 DIAGNOSIS — M25.571 PAIN IN JOINT, ANKLE AND FOOT, RIGHT: ICD-10-CM

## 2021-02-04 PROCEDURE — 97140 MANUAL THERAPY 1/> REGIONS: CPT | Mod: GP | Performed by: PHYSICAL THERAPIST

## 2021-02-04 PROCEDURE — 97116 GAIT TRAINING THERAPY: CPT | Mod: GP | Performed by: PHYSICAL THERAPIST

## 2021-02-09 ENCOUNTER — THERAPY VISIT (OUTPATIENT)
Dept: PHYSICAL THERAPY | Facility: CLINIC | Age: 41
End: 2021-02-09
Payer: COMMERCIAL

## 2021-02-09 DIAGNOSIS — M25.571 PAIN IN JOINT, ANKLE AND FOOT, RIGHT: ICD-10-CM

## 2021-02-09 PROCEDURE — 97140 MANUAL THERAPY 1/> REGIONS: CPT | Mod: GP | Performed by: PHYSICAL THERAPIST

## 2021-02-09 PROCEDURE — 97110 THERAPEUTIC EXERCISES: CPT | Mod: GP | Performed by: PHYSICAL THERAPIST

## 2021-02-11 ENCOUNTER — THERAPY VISIT (OUTPATIENT)
Dept: PHYSICAL THERAPY | Facility: CLINIC | Age: 41
End: 2021-02-11
Payer: COMMERCIAL

## 2021-02-11 DIAGNOSIS — M25.571 PAIN IN JOINT, ANKLE AND FOOT, RIGHT: ICD-10-CM

## 2021-02-11 PROCEDURE — 97110 THERAPEUTIC EXERCISES: CPT | Mod: GP | Performed by: PHYSICAL THERAPIST

## 2021-02-11 PROCEDURE — 97140 MANUAL THERAPY 1/> REGIONS: CPT | Mod: GP | Performed by: PHYSICAL THERAPIST

## 2021-02-16 ENCOUNTER — THERAPY VISIT (OUTPATIENT)
Dept: PHYSICAL THERAPY | Facility: CLINIC | Age: 41
End: 2021-02-16
Payer: COMMERCIAL

## 2021-02-16 DIAGNOSIS — M25.571 PAIN IN JOINT, ANKLE AND FOOT, RIGHT: ICD-10-CM

## 2021-02-16 PROCEDURE — 97110 THERAPEUTIC EXERCISES: CPT | Mod: GP | Performed by: PHYSICAL THERAPIST

## 2021-02-16 PROCEDURE — 97116 GAIT TRAINING THERAPY: CPT | Mod: GP | Performed by: PHYSICAL THERAPIST

## 2021-02-16 PROCEDURE — 97140 MANUAL THERAPY 1/> REGIONS: CPT | Mod: GP | Performed by: PHYSICAL THERAPIST

## 2021-02-18 ENCOUNTER — THERAPY VISIT (OUTPATIENT)
Dept: PHYSICAL THERAPY | Facility: CLINIC | Age: 41
End: 2021-02-18
Payer: COMMERCIAL

## 2021-02-18 DIAGNOSIS — M25.571 PAIN IN JOINT, ANKLE AND FOOT, RIGHT: ICD-10-CM

## 2021-02-18 PROCEDURE — 97110 THERAPEUTIC EXERCISES: CPT | Mod: GP | Performed by: PHYSICAL THERAPIST

## 2021-02-18 PROCEDURE — 97116 GAIT TRAINING THERAPY: CPT | Mod: GP | Performed by: PHYSICAL THERAPIST

## 2021-02-18 PROCEDURE — 97140 MANUAL THERAPY 1/> REGIONS: CPT | Mod: GP | Performed by: PHYSICAL THERAPIST

## 2021-03-03 ENCOUNTER — THERAPY VISIT (OUTPATIENT)
Dept: PHYSICAL THERAPY | Facility: CLINIC | Age: 41
End: 2021-03-03
Payer: COMMERCIAL

## 2021-03-03 DIAGNOSIS — M25.571 PAIN IN JOINT, ANKLE AND FOOT, RIGHT: ICD-10-CM

## 2021-03-03 PROCEDURE — 97110 THERAPEUTIC EXERCISES: CPT | Mod: GP | Performed by: PHYSICAL THERAPIST

## 2021-03-03 PROCEDURE — 97116 GAIT TRAINING THERAPY: CPT | Mod: GP | Performed by: PHYSICAL THERAPIST

## 2021-03-03 PROCEDURE — 97140 MANUAL THERAPY 1/> REGIONS: CPT | Mod: GP | Performed by: PHYSICAL THERAPIST

## 2021-07-04 PROBLEM — M25.571 PAIN IN JOINT, ANKLE AND FOOT, RIGHT: Status: RESOLVED | Noted: 2020-12-21 | Resolved: 2021-07-04

## 2021-07-04 NOTE — PROGRESS NOTES
Subjective:  HPI  Physical Exam                    Objective:  System    Physical Exam    General     ROS    Assessment/Plan:    DISCHARGE REPORT    Progress reporting period is from 1/29 to 3/3/2021.       SUBJECTIVE   Subjective: A turning point again over the past two weeks, can walk x 25', no walking boot, less pain, can perform an eccentric toe raise on the right     Current Pain level: (1-3/10).      .   Changes in function:  Continues to need to be very mindful with how he walks, cannot push off well. Slow gait and brian.  Adverse reaction to treatment or activity: None    OBJECTIVE    Objective: possible OA of the right great toe, Cont. improved gait wering shoes with insert,improved push off and PF on the right. Very slow gains.     ASSESSMENT/PLAN  Updated problem list and treatment plan: Diagnosis 1:  Right ankle tightrope procedure.  Pain -  hot/cold therapy, manual therapy, self management and education  Decreased ROM/flexibility - manual therapy, therapeutic exercise and home program  Decreased strength - therapeutic exercise, therapeutic activities and home program  Impaired balance - neuro re-education, therapeutic activities and home program  Edema - self management/home program  Impaired gait - gait training and home program  Impaired muscle performance - neuro re-education and home program  Decreased function - therapeutic activities and home program  STG/LTGs have been met or progress has been made towards goals:  Yes (See Goal flow sheet completed today.)  Assessment of Progress: The patient's progress has plateaued.  Self Management Plans:  Patient has been instructed in a home treatment program.  Patient  has been instructed in self management of symptoms.      Recommendations:  This patient would benefit from further evaluation.    Please refer to the daily flowsheet for treatment today, total treatment time and time spent performing 1:1 timed codes.

## 2021-08-14 ENCOUNTER — HEALTH MAINTENANCE LETTER (OUTPATIENT)
Age: 41
End: 2021-08-14

## 2021-10-09 ENCOUNTER — HEALTH MAINTENANCE LETTER (OUTPATIENT)
Age: 41
End: 2021-10-09

## 2021-10-29 ENCOUNTER — LAB REQUISITION (OUTPATIENT)
Dept: LAB | Facility: CLINIC | Age: 41
End: 2021-10-29

## 2021-10-29 LAB — HBV SURFACE AB SERPL IA-ACNC: 181.64 M[IU]/ML

## 2021-10-29 PROCEDURE — 86481 TB AG RESPONSE T-CELL SUSP: CPT | Performed by: INTERNAL MEDICINE

## 2021-10-29 PROCEDURE — 86706 HEP B SURFACE ANTIBODY: CPT | Performed by: INTERNAL MEDICINE

## 2021-10-31 LAB
GAMMA INTERFERON BACKGROUND BLD IA-ACNC: 0.06 IU/ML
M TB IFN-G BLD-IMP: NEGATIVE
M TB IFN-G CD4+ BCKGRND COR BLD-ACNC: 9.94 IU/ML
MITOGEN IGNF BCKGRD COR BLD-ACNC: -0.02 IU/ML
MITOGEN IGNF BCKGRD COR BLD-ACNC: 0 IU/ML
QUANTIFERON MITOGEN: 10 IU/ML
QUANTIFERON NIL TUBE: 0.06 IU/ML
QUANTIFERON TB1 TUBE: 0.04 IU/ML
QUANTIFERON TB2 TUBE: 0.06

## 2022-01-18 PROCEDURE — U0005 INFEC AGEN DETEC AMPLI PROBE: HCPCS | Performed by: INTERNAL MEDICINE

## 2022-01-19 ENCOUNTER — LAB REQUISITION (OUTPATIENT)
Dept: LAB | Facility: CLINIC | Age: 42
End: 2022-01-19

## 2022-01-19 LAB — SARS-COV-2 RNA RESP QL NAA+PROBE: POSITIVE

## 2022-09-17 ENCOUNTER — HEALTH MAINTENANCE LETTER (OUTPATIENT)
Age: 42
End: 2022-09-17

## 2023-01-31 ENCOUNTER — APPOINTMENT (OUTPATIENT)
Dept: URBAN - METROPOLITAN AREA CLINIC 259 | Age: 43
Setting detail: DERMATOLOGY
End: 2023-01-31

## 2023-01-31 DIAGNOSIS — I78.8 OTHER DISEASES OF CAPILLARIES: ICD-10-CM

## 2023-01-31 PROCEDURE — 99202 OFFICE O/P NEW SF 15 MIN: CPT

## 2023-01-31 PROCEDURE — OTHER COUNSELING: OTHER

## 2023-01-31 PROCEDURE — OTHER MIPS QUALITY: OTHER

## 2023-01-31 ASSESSMENT — LOCATION DETAILED DESCRIPTION DERM: LOCATION DETAILED: RIGHT MEDIAL INFERIOR EYELID

## 2023-01-31 ASSESSMENT — LOCATION ZONE DERM: LOCATION ZONE: EYELID

## 2023-01-31 ASSESSMENT — LOCATION SIMPLE DESCRIPTION DERM: LOCATION SIMPLE: RIGHT INFERIOR EYELID

## 2023-01-31 NOTE — PROCEDURE: COUNSELING
Patient Specific Counseling (Will Not Stick From Patient To Patient): Recommended IPL laser
Detail Level: Zone

## 2023-01-31 NOTE — HPI: RASH
What Type Of Note Output Would You Prefer (Optional)?: Standard Output
Is The Patient Presenting As Previously Scheduled?: Yes
How Severe Is Your Rash?: mild
Is This A New Presentation, Or A Follow-Up?: Rash
Additional History: Pt states this has been here about a year and a half and that it is pretty much asymptomatic, sometimes gets flaky, but otherwise does not bother him. He went to a previous dermatologist and received desonide and triamcinolone, but states that even while using it, it never went away.

## 2023-02-01 ENCOUNTER — APPOINTMENT (OUTPATIENT)
Dept: URBAN - METROPOLITAN AREA CLINIC 256 | Age: 43
Setting detail: DERMATOLOGY
End: 2023-02-02

## 2023-02-01 DIAGNOSIS — L71.0 PERIORAL DERMATITIS: ICD-10-CM

## 2023-02-01 PROCEDURE — OTHER PRESCRIPTION: OTHER

## 2023-02-01 PROCEDURE — OTHER MIPS QUALITY: OTHER

## 2023-02-01 PROCEDURE — OTHER COUNSELING: OTHER

## 2023-02-01 PROCEDURE — 99213 OFFICE O/P EST LOW 20 MIN: CPT

## 2023-02-01 RX ORDER — DOXYCYCLINE HYCLATE 100 MG/1
CAPSULE, GELATIN COATED ORAL TWICE DAILY
Qty: 60 | Refills: 0 | Status: ERX | COMMUNITY
Start: 2023-02-01

## 2023-02-01 NOTE — PROCEDURE: COUNSELING
Patient Specific Counseling (Will Not Stick From Patient To Patient): The lesions are atypical but could represent periorbital dermatitis. He will take one month of Doxycycline and we will fu then. Possible biopsy may be needed if not clear of use of Protopic although I don't think this is a dermatitis and he has not typical contact problems
Detail Level: Detailed

## 2023-05-10 ENCOUNTER — RX ONLY (RX ONLY)
Age: 43
End: 2023-05-10

## 2023-05-10 ENCOUNTER — APPOINTMENT (OUTPATIENT)
Dept: URBAN - METROPOLITAN AREA CLINIC 256 | Age: 43
Setting detail: DERMATOLOGY
End: 2023-05-10

## 2023-05-10 DIAGNOSIS — L84 CORNS AND CALLOSITIES: ICD-10-CM

## 2023-05-10 DIAGNOSIS — L98.8 OTHER SPECIFIED DISORDERS OF THE SKIN AND SUBCUTANEOUS TISSUE: ICD-10-CM

## 2023-05-10 DIAGNOSIS — Z71.89 OTHER SPECIFIED COUNSELING: ICD-10-CM

## 2023-05-10 DIAGNOSIS — D22 MELANOCYTIC NEVI: ICD-10-CM

## 2023-05-10 DIAGNOSIS — L57.8 OTHER SKIN CHANGES DUE TO CHRONIC EXPOSURE TO NONIONIZING RADIATION: ICD-10-CM

## 2023-05-10 PROBLEM — D22.5 MELANOCYTIC NEVI OF TRUNK: Status: ACTIVE | Noted: 2023-05-10

## 2023-05-10 PROBLEM — D22.9 MELANOCYTIC NEVI, UNSPECIFIED: Status: ACTIVE | Noted: 2023-05-10

## 2023-05-10 PROCEDURE — 99213 OFFICE O/P EST LOW 20 MIN: CPT

## 2023-05-10 PROCEDURE — OTHER MIPS QUALITY: OTHER

## 2023-05-10 PROCEDURE — OTHER COUNSELING: OTHER

## 2023-05-10 RX ORDER — DESONIDE 0.5 MG/G
OINTMENT TOPICAL
Qty: 15 | Refills: 0 | Status: ERX | COMMUNITY
Start: 2023-05-10

## 2023-05-10 ASSESSMENT — LOCATION SIMPLE DESCRIPTION DERM
LOCATION SIMPLE: PLANTAR SURFACE OF RIGHT 1ST TOE
LOCATION SIMPLE: RIGHT LOWER BACK

## 2023-05-10 ASSESSMENT — LOCATION DETAILED DESCRIPTION DERM
LOCATION DETAILED: RIGHT SUPERIOR MEDIAL MIDBACK
LOCATION DETAILED: RIGHT LATERAL PLANTAR 1ST TOE
LOCATION DETAILED: RIGHT INFERIOR MEDIAL MIDBACK

## 2023-05-10 ASSESSMENT — LOCATION ZONE DERM
LOCATION ZONE: TOE
LOCATION ZONE: TRUNK

## 2023-10-08 ENCOUNTER — HEALTH MAINTENANCE LETTER (OUTPATIENT)
Age: 43
End: 2023-10-08

## 2024-04-27 ENCOUNTER — LAB REQUISITION (OUTPATIENT)
Dept: LAB | Facility: CLINIC | Age: 44
End: 2024-04-27

## 2024-04-27 PROCEDURE — 87635 SARS-COV-2 COVID-19 AMP PRB: CPT | Performed by: INTERNAL MEDICINE

## 2024-04-28 LAB — SARS-COV-2 RNA RESP QL NAA+PROBE: NEGATIVE
